# Patient Record
Sex: FEMALE | Race: BLACK OR AFRICAN AMERICAN | URBAN - METROPOLITAN AREA
[De-identification: names, ages, dates, MRNs, and addresses within clinical notes are randomized per-mention and may not be internally consistent; named-entity substitution may affect disease eponyms.]

---

## 2020-02-06 ENCOUNTER — HISTORICAL (OUTPATIENT)
Dept: ADMINISTRATIVE | Facility: HOSPITAL | Age: 25
End: 2020-02-06

## 2020-02-06 LAB
BILIRUB SERPL-MCNC: NEGATIVE MG/DL
BLOOD URINE, POC: NORMAL
CLARITY, POC UA: CLEAR
COLOR, POC UA: YELLOW
GLUCOSE UR QL STRIP: NEGATIVE
KETONES UR QL STRIP: NEGATIVE
LEUKOCYTE EST, POC UA: NORMAL
NITRITE, POC UA: NEGATIVE
PH, POC UA: 6
POC BETA-HCG (QUAL): NEGATIVE
PROTEIN, POC: NORMAL
SPECIFIC GRAVITY, POC UA: 1.03
UROBILINOGEN, POC UA: NORMAL

## 2020-02-08 LAB — FINAL CULTURE: NO GROWTH

## 2022-04-11 ENCOUNTER — HISTORICAL (OUTPATIENT)
Dept: ADMINISTRATIVE | Facility: HOSPITAL | Age: 27
End: 2022-04-11

## 2022-04-24 VITALS
BODY MASS INDEX: 25.44 KG/M2 | SYSTOLIC BLOOD PRESSURE: 112 MMHG | OXYGEN SATURATION: 100 % | HEIGHT: 67 IN | DIASTOLIC BLOOD PRESSURE: 77 MMHG | WEIGHT: 162.06 LBS

## 2022-05-04 NOTE — HISTORICAL [facility] CERNER
This is a historical note converted from [employer]. Formatting and pictures may have been removed.  Please reference [employer] for original formatting and attached multimedia. Chief Complaint  V/D X 6 hours. Vomited X 5.  History of Present Illness  25 year old female with complaints of vomiting and diarrhea x 6 hrs. Vomiting started at 0530 (emesis x 5) Diarrhea started shortly after. Patient states she started having soft stool 3 days ago; no black or bloody stools. ?  Last time patient ate was at 1930 (buffet at local restaurant); patient concerned with food poisoning.  No chronic medical problems. Patient requesting pregnancy test; negative UPT at home.  Review of Systems  ?  CONSTITUTIONAL: No recent changes in weight or appetite. No?fever, chills,or weakness.?  Eyes: No?blurred vision, double vision or yellowing of?sclerae.  Ears, Nose, Throat: No throat pain or difficulty swallowing. No ear pain or congestion.?  CARDIOVASCULAR: No chest pain or?palpitations.  RESPIRATORY: No shortness of breath, wheezing, coughing or sputum production.?  GASTROINTESTINAL: Nausea, vomiting and diarrhea. No black or bloody stools.  GENITOURINARY:?No burning/pain with urination or blood in?urine. No urinary hesitancy or urgency.?  NEUROLOGICAL: No headache, dizziness, syncope, paralysis, ataxia, numbness or tingling in the extremities. No bowel or bladder incontinence.?  ?  ?  Physical Exam  Vitals & Measurements  T:?36.4? ?C (Oral)? HR:?89(Peripheral)? RR:?20? BP:?112/77? SpO2:?100%?  HT:?169?cm? WT:?73.5?kg? BMI:?25.73? LMP:?01/25/2020 00:00 CST?  Vital Signs reviewed  GENERAL: Awake and alert; no acute distress.  HENT: Normocephalic. Normal appearing oral cavity and posterior pharynx. No thyroid enlargement or nodules.  CV: Normal rate and rhythm. No edema. Normal peripheral perfusion or pulses.  RESP: Respirations even and nonlabored. BBS clear to auscultation.  GI: Abdomen soft and non distended. Normoactive bowel sounds x 4  quadrants.? No tenderness with palpation; no rebound or guarding. No evidence of herniations, ascites or?organomegaly.?No CVA tenderness.  NEURO: Awake, alert and oriented. No focal or sensory deficits.  INTEGUMENTARY: Skin warm, dry, and intact. No rashes or?unusual bruising.  PSYCH: Appropriate mood and affect; cooperative.  ?  Assessment/Plan  1.?Gastroenteritis?K52.9  Urine Dipstick with 1+ protein, trace blood and leukocytes; negative nitrite and ketones. Urine C&S pending. ?UPT negative.  Zofran 4 mg ODT x 1 in clinic with improvement of N/V. Tolerated water without N/V/D.  Rx for Zofran 4 mg ODT TID prn N/V.  Increase oral fluids; advance diet as tolerated. BRAT diet.  Patient attempted to collect stool for studies but unable to collect stool specimen.  Follow up in urgent care for stool studies if diarrhea persists past 48 hrs.  ER precautions for worsening in condition.  ?  Nausea vomiting and diarrhea?R11.2  Ordered:  ondansetron, 4 mg = 1 tab(s), Oral, q8hr, PRN PRN as needed for nausea/vomiting, # 9 tab(s), 0 Refill(s), Pharmacy: [facility] #[id number], 169, cm, Height/Length Dosing, 02/06/20 11:11:00 CST, 73.5, kg, Weight Dosing, 02/06/20 11:11:00 CST  Urine Culture [id number], Stat collect, 02/06/20 11:39:00 CST, Urine, Clean Catch, Nurse collect, Stop date 02/06/20 11:39:00 CST, Nausea vomiting and diarrhea  ?   Problem List/Past Medical History  Ongoing  No chronic problems  Historical  No qualifying data  Procedure/Surgical History  C section   Medications  Zofran ODT 4 mg oral tablet, disintegrating, 4 mg= 1 tab(s), Oral, q8hr, PRN  Allergies  No Known Medication Allergies  Social History  Abuse/Neglect  No, 02/06/2020  Alcohol  Current, 1-2 times per month, 02/06/2020  Substance Use  Current, Marijuana, Daily, 02/06/2020  Tobacco  Never (less than 100 in lifetime), N/A, 02/06/2020  Family History  Bipolar: Mother.  Health Maintenance  Health Maintenance  ???Pending?(in the next year)  ???  ??Due?  ??? ? ? ?Alcohol Misuse Screening due??01/01/20??and every 1??year(s)  ??? ? ? ?ADL Screening due??02/06/20??and every 1??year(s)  ??? ? ? ?Tetanus Vaccine due??02/06/20??and every 10??year(s)  ??? ??Due In Future?  ??? ? ? ?Obesity Screening not due until??01/01/21??and every 1??year(s)  ???Satisfied?(in the past 1 year)  ??? ??Satisfied?  ??? ? ? ?Blood Pressure Screening on??02/06/20.??Satisfied by Russel Bueno  ??? ? ? ?Body Mass Index Check on??02/06/20.??Satisfied by Russel Bueno  ??? ? ? ?Obesity Screening on??02/06/20.??Satisfied by Russel Bueno  ?  Lab Results  Test Name Test Result Date/Time   Urine Color Urine Dipstick Yellow 02/06/2020 11:32 CST   Urine Appearance Urine Dipstick Clear 02/06/2020 11:32 CST   pH Urine Dipstick 6 02/06/2020 11:32 CST   Specific Gravity Urine Dipstick 1.030 02/06/2020 11:32 CST   Blood Urine Dipstick Trace 02/06/2020 11:32 CST   Glucose Urine Dipstick Negative 02/06/2020 11:32 CST   Ketones Urine Dipstick Negative 02/06/2020 11:32 CST   Protein Urine Dipstick 1+ (30 mg/dl) 02/06/2020 11:32 CST   Bilirubin Urine Dipstick Negative 02/06/2020 11:32 CST   Urobilinogen Urine Dipstick 0.2 mg/dl 02/06/2020 11:32 CST   Leukocytes Urine Dipstick Trace 02/06/2020 11:32 CST   Nitrite Urine Dipstick Negative 02/06/2020 11:32 CST   U beta hCG Ql POC Negative 02/06/2020 11:32 CST